# Patient Record
Sex: FEMALE | Race: WHITE | Employment: UNEMPLOYED | URBAN - METROPOLITAN AREA
[De-identification: names, ages, dates, MRNs, and addresses within clinical notes are randomized per-mention and may not be internally consistent; named-entity substitution may affect disease eponyms.]

---

## 2023-10-15 PROBLEM — L98.1 FACTITIAL DERMATITIS: Status: ACTIVE | Noted: 2022-02-10

## 2023-10-15 PROBLEM — L29.9 PRURITUS: Status: ACTIVE | Noted: 2022-02-10

## 2023-10-15 PROBLEM — L23.9 ALLERGIC DERMATITIS: Status: ACTIVE | Noted: 2023-10-15

## 2023-10-15 PROBLEM — L21.8 OTHER SEBORRHEIC DERMATITIS: Status: ACTIVE | Noted: 2022-02-10

## 2023-10-15 PROBLEM — L57.9 SKIN CHANGES DUE TO CHRONIC EXPOSURE TO NONIONIZING RADIATION, UNSPECIFIED: Status: ACTIVE | Noted: 2022-02-10

## 2023-10-15 PROBLEM — E03.9 HYPOTHYROIDISM: Status: ACTIVE | Noted: 2023-10-15

## 2023-10-15 PROBLEM — L81.0 POSTINFLAMMATORY HYPERPIGMENTATION: Status: ACTIVE | Noted: 2022-02-10

## 2023-10-15 PROBLEM — K75.9 HEPATITIS: Status: ACTIVE | Noted: 2023-10-15

## 2023-10-15 PROBLEM — H01.131 ECZEMATOUS DERMATITIS OF RIGHT UPPER EYELID: Status: ACTIVE | Noted: 2022-02-10

## 2023-10-15 PROBLEM — B85.2 LICE: Status: ACTIVE | Noted: 2023-10-15

## 2023-10-15 PROBLEM — B86 SCABIES: Status: ACTIVE | Noted: 2022-02-10

## 2023-10-15 PROBLEM — L81.4 OTHER MELANIN HYPERPIGMENTATION: Status: ACTIVE | Noted: 2022-02-10

## 2023-10-15 PROBLEM — R53.83 FATIGUE: Status: ACTIVE | Noted: 2023-10-15

## 2023-10-15 PROBLEM — L81.9 PIGMENTED SKIN LESION: Status: ACTIVE | Noted: 2023-10-15

## 2023-10-15 PROBLEM — H01.132 ECZEMATOUS DERMATITIS OF RIGHT LOWER EYELID: Status: ACTIVE | Noted: 2022-02-10

## 2023-10-15 PROBLEM — L30.9 DERMATITIS: Status: ACTIVE | Noted: 2023-10-15

## 2023-10-15 PROBLEM — L21.9 SEBORRHEIC DERMATITIS, UNSPECIFIED: Status: ACTIVE | Noted: 2022-02-10

## 2023-10-15 RX ORDER — TRETINOIN 0.25 MG/G
CREAM TOPICAL
COMMUNITY
Start: 2021-11-12 | End: 2023-10-18

## 2023-10-15 RX ORDER — TACROLIMUS 1 MG/G
OINTMENT TOPICAL
COMMUNITY
Start: 2020-10-13 | End: 2023-10-18

## 2023-10-15 RX ORDER — LEVOTHYROXINE SODIUM 150 UG/1
1 TABLET ORAL DAILY
COMMUNITY
Start: 2022-07-15 | End: 2023-10-18 | Stop reason: SDUPTHER

## 2023-10-15 RX ORDER — TRIAMCINOLONE ACETONIDE 1 MG/G
CREAM TOPICAL
COMMUNITY
Start: 2020-11-13 | End: 2023-10-18

## 2023-10-15 RX ORDER — KETOCONAZOLE 20 MG/G
CREAM TOPICAL
COMMUNITY
Start: 2020-05-20 | End: 2023-10-18

## 2023-10-15 RX ORDER — KETOCONAZOLE 20 MG/ML
SHAMPOO, SUSPENSION TOPICAL
COMMUNITY
Start: 2020-05-20 | End: 2023-10-18

## 2023-10-15 RX ORDER — PERMETHRIN 50 MG/G
CREAM TOPICAL
COMMUNITY
Start: 2020-05-20 | End: 2023-10-18

## 2023-10-18 ENCOUNTER — OFFICE VISIT (OUTPATIENT)
Dept: PRIMARY CARE | Facility: CLINIC | Age: 53
End: 2023-10-18
Payer: COMMERCIAL

## 2023-10-18 VITALS
DIASTOLIC BLOOD PRESSURE: 84 MMHG | OXYGEN SATURATION: 96 % | BODY MASS INDEX: 29.95 KG/M2 | WEIGHT: 169 LBS | HEIGHT: 63 IN | SYSTOLIC BLOOD PRESSURE: 126 MMHG | HEART RATE: 85 BPM | TEMPERATURE: 98 F

## 2023-10-18 DIAGNOSIS — E03.9 HYPOTHYROIDISM, UNSPECIFIED TYPE: ICD-10-CM

## 2023-10-18 PROCEDURE — 84443 ASSAY THYROID STIM HORMONE: CPT

## 2023-10-18 PROCEDURE — 84439 ASSAY OF FREE THYROXINE: CPT

## 2023-10-18 PROCEDURE — 99213 OFFICE O/P EST LOW 20 MIN: CPT | Performed by: FAMILY MEDICINE

## 2023-10-18 PROCEDURE — 36415 COLL VENOUS BLD VENIPUNCTURE: CPT

## 2023-10-18 RX ORDER — LEVOTHYROXINE SODIUM 150 UG/1
150 TABLET ORAL DAILY
Qty: 30 TABLET | Refills: 1 | Status: SHIPPED | OUTPATIENT
Start: 2023-10-18 | End: 2024-01-05

## 2023-10-18 NOTE — PROGRESS NOTES
"Subjective   Patient ID: Shikha Lee is a 53 y.o. female who presents for Med Refill.    HPI Pt is here for refills of medications to treat the following medical conditions. Unless otherwise stated, these conditions are well-controlled, pt is taking medications s Rx, and there are no reported side effects to medications or other treatment: Hypothyroidism - States she ran out of Synthroid 150mcg five weeks ago and resumed her previous dose of 125mcg.     Review of Systems   All other systems reviewed and are negative.      Objective   /84   Pulse 85   Temp 36.7 °C (98 °F)   Ht 1.6 m (5' 3\")   Wt 76.7 kg (169 lb)   SpO2 96%   BMI 29.94 kg/m²     Physical Exam  Constitutional:       Appearance: Normal appearance.   HENT:      Mouth/Throat:      Mouth: Mucous membranes are moist.   Eyes:      Conjunctiva/sclera: Conjunctivae normal.   Cardiovascular:      Rate and Rhythm: Normal rate and regular rhythm.   Pulmonary:      Effort: Pulmonary effort is normal.      Breath sounds: Normal breath sounds.   Abdominal:      Palpations: Abdomen is soft.   Musculoskeletal:         General: Normal range of motion.   Skin:     General: Skin is warm and dry.   Neurological:      Mental Status: She is alert and oriented to person, place, and time.   Psychiatric:         Mood and Affect: Mood normal.       Assessment/Plan Refilled medication. Repeat TSH and follow up 6-8 weeks.         "

## 2023-10-19 LAB
T4 FREE SERPL-MCNC: 1.06 NG/DL (ref 0.78–1.48)
TSH SERPL-ACNC: 20.86 MIU/L (ref 0.44–3.98)

## 2023-12-21 ENCOUNTER — OFFICE VISIT (OUTPATIENT)
Dept: URGENT CARE | Facility: CLINIC | Age: 53
End: 2023-12-21
Payer: COMMERCIAL

## 2023-12-21 ENCOUNTER — LAB (OUTPATIENT)
Dept: LAB | Facility: LAB | Age: 53
End: 2023-12-21
Payer: COMMERCIAL

## 2023-12-21 VITALS
SYSTOLIC BLOOD PRESSURE: 152 MMHG | OXYGEN SATURATION: 97 % | HEIGHT: 65 IN | HEART RATE: 81 BPM | WEIGHT: 167.2 LBS | DIASTOLIC BLOOD PRESSURE: 78 MMHG | BODY MASS INDEX: 27.86 KG/M2

## 2023-12-21 DIAGNOSIS — K04.7 DENTAL INFECTION: Primary | ICD-10-CM

## 2023-12-21 DIAGNOSIS — E03.9 HYPOTHYROIDISM, UNSPECIFIED TYPE: ICD-10-CM

## 2023-12-21 LAB
T4 FREE SERPL-MCNC: 0.96 NG/DL (ref 0.61–1.12)
TSH SERPL-ACNC: 0.6 MIU/L (ref 0.44–3.98)

## 2023-12-21 PROCEDURE — 84443 ASSAY THYROID STIM HORMONE: CPT

## 2023-12-21 PROCEDURE — 84439 ASSAY OF FREE THYROXINE: CPT

## 2023-12-21 PROCEDURE — 99213 OFFICE O/P EST LOW 20 MIN: CPT | Performed by: NURSE PRACTITIONER

## 2023-12-21 PROCEDURE — 36415 COLL VENOUS BLD VENIPUNCTURE: CPT

## 2023-12-21 RX ORDER — CLINDAMYCIN HYDROCHLORIDE 150 MG/1
450 CAPSULE ORAL 3 TIMES DAILY
Qty: 63 CAPSULE | Refills: 0 | Status: SHIPPED | OUTPATIENT
Start: 2023-12-21 | End: 2023-12-28

## 2023-12-21 ASSESSMENT — PAIN SCALES - GENERAL: PAINLEVEL: 8

## 2023-12-21 NOTE — PROGRESS NOTES
Subjective   Patient ID: Shikha Lee is a 53 y.o. female.    HPI Lower jaw pain. Had a telehealth visit 12/11 and was given antibiotics. Finished medication this morning. Pain improved at first and then got bad again. Has some swelling in the jaw. Has not been able to get into the dentist due to insurance changes.  Patient states the clindamycin she was taking was 300 mg 3 times a day last dose today.  Right side of her jaw has improved left-sided gotten worse she has very poor dentition and is post to go to the dentist to have this taken out      The following portions of the chart were reviewed this encounter and updated as appropriate:         Review of Systems   All other systems reviewed and are negative.    Objective   Physical Exam  Vitals and nursing note reviewed.   Constitutional:       General: She is not in acute distress.     Appearance: Normal appearance. She is not toxic-appearing.   HENT:      Head: Normocephalic.      Right Ear: External ear normal.      Left Ear: External ear normal.      Nose: Nose normal.      Mouth/Throat:      Mouth: Mucous membranes are moist.      Pharynx: No oropharyngeal exudate or posterior oropharyngeal erythema.      Comments: Poor dentition throughout, broken teeth noted to front left side across bottom, upper partials, uvula midline, mucous membranes are moist, no abscess appreciated, no trismus noted  Eyes:      Extraocular Movements: Extraocular movements intact.   Cardiovascular:      Rate and Rhythm: Normal rate and regular rhythm.      Heart sounds: Normal heart sounds.   Pulmonary:      Effort: Pulmonary effort is normal.      Breath sounds: Normal breath sounds. No wheezing.   Musculoskeletal:         General: Normal range of motion.      Cervical back: Normal range of motion and neck supple.   Skin:     Capillary Refill: Capillary refill takes less than 2 seconds.   Neurological:      General: No focal deficit present.      Mental Status: She is alert and  oriented to person, place, and time.   Psychiatric:         Mood and Affect: Mood normal.         Behavior: Behavior normal.         Thought Content: Thought content normal.       Procedures    Assessment/Plan   Diagnoses and all orders for this visit:  Dental infection  -     clindamycin (Cleocin HCL) 150 mg capsule; Take 3 capsules (450 mg) by mouth 3 times a day for 7 days.  Increase clindamycin dose to 450 mg 3 times a day  Continue Motrin for pain  Patient agrees    Above plan of care was reviewed with the patient, all questions were answered, through shared decision making the patient agrees with this plan of care.    Red flags for strict return precaution have been reviewed with the patient and or patient gaurdian, patient is alert, oriented and non-toxic appearing, has decision making capabilities and agrees with the plan of care through shared decision making at this time. Current diagnosis, any medication changes, lab or radiologic results have been reviewed with the patient at the time of visit. If symptoms do not improve, or worsen, patient is to follow up with PCP or report to the emergency room.   Patient is alert and oriented x3 vital signs stable nontoxic-appearing and has decision-making capabilities.    Please note that the majority this note was made with Dragon speaking software there may be grammatical errors secondary to the speaking program.

## 2024-03-04 DIAGNOSIS — E03.9 HYPOTHYROIDISM, UNSPECIFIED TYPE: ICD-10-CM

## 2024-03-07 RX ORDER — LEVOTHYROXINE SODIUM 150 UG/1
TABLET ORAL
Qty: 14 TABLET | Refills: 0 | Status: SHIPPED | OUTPATIENT
Start: 2024-03-07 | End: 2024-04-15 | Stop reason: SDUPTHER

## 2024-04-15 ENCOUNTER — TELEPHONE (OUTPATIENT)
Dept: PRIMARY CARE | Facility: CLINIC | Age: 54
End: 2024-04-15
Payer: COMMERCIAL

## 2024-04-15 DIAGNOSIS — E03.9 HYPOTHYROIDISM, UNSPECIFIED TYPE: ICD-10-CM

## 2024-04-15 RX ORDER — LEVOTHYROXINE SODIUM 150 UG/1
TABLET ORAL
Qty: 30 TABLET | Refills: 0 | Status: SHIPPED | OUTPATIENT
Start: 2024-04-15

## 2024-09-20 ENCOUNTER — OFFICE VISIT (OUTPATIENT)
Dept: PRIMARY CARE | Facility: CLINIC | Age: 54
End: 2024-09-20
Payer: COMMERCIAL

## 2024-09-20 VITALS
HEART RATE: 80 BPM | BODY MASS INDEX: 29.46 KG/M2 | DIASTOLIC BLOOD PRESSURE: 80 MMHG | OXYGEN SATURATION: 99 % | HEIGHT: 65 IN | WEIGHT: 176.8 LBS | SYSTOLIC BLOOD PRESSURE: 138 MMHG

## 2024-09-20 DIAGNOSIS — E03.9 HYPOTHYROIDISM, UNSPECIFIED TYPE: ICD-10-CM

## 2024-09-20 PROBLEM — Z86.59 HISTORY OF DEPRESSION: Status: ACTIVE | Noted: 2024-09-20

## 2024-09-20 PROBLEM — R06.00 DYSPNEA: Status: ACTIVE | Noted: 2018-04-19

## 2024-09-20 PROBLEM — F32.9 MAJOR DEPRESSIVE DISORDER: Status: ACTIVE | Noted: 2024-09-20

## 2024-09-20 PROBLEM — F22 DELUSIONS OF PARASITOSIS (MULTI): Status: ACTIVE | Noted: 2020-06-24

## 2024-09-20 PROBLEM — L25.9 CONTACT DERMATITIS AND ECZEMA: Status: ACTIVE | Noted: 2021-11-11

## 2024-09-20 PROBLEM — L29.9 PRURITUS, UNSPECIFIED: Status: ACTIVE | Noted: 2020-05-20

## 2024-09-20 PROBLEM — L98.9 INFLAMMATORY DERMATOSIS: Status: ACTIVE | Noted: 2023-10-15

## 2024-09-20 PROBLEM — G40.919 INTRACTABLE EPILEPSY WITHOUT STATUS EPILEPTICUS: Status: ACTIVE | Noted: 2024-09-20

## 2024-09-20 PROBLEM — R53.82 CHRONIC FATIGUE: Status: ACTIVE | Noted: 2023-10-15

## 2024-09-20 PROBLEM — G47.10 HYPERSOMNIA: Status: ACTIVE | Noted: 2024-09-20

## 2024-09-20 PROBLEM — G44.229 CHRONIC TENSION-TYPE HEADACHE, NOT INTRACTABLE: Status: ACTIVE | Noted: 2024-09-20

## 2024-09-20 PROCEDURE — 84443 ASSAY THYROID STIM HORMONE: CPT

## 2024-09-20 PROCEDURE — 99213 OFFICE O/P EST LOW 20 MIN: CPT | Performed by: FAMILY MEDICINE

## 2024-09-20 PROCEDURE — 3008F BODY MASS INDEX DOCD: CPT | Performed by: FAMILY MEDICINE

## 2024-09-20 PROCEDURE — 84439 ASSAY OF FREE THYROXINE: CPT

## 2024-09-20 RX ORDER — LEVOTHYROXINE SODIUM 150 UG/1
TABLET ORAL
Qty: 90 TABLET | Refills: 1 | Status: SHIPPED | OUTPATIENT
Start: 2024-09-20

## 2024-09-20 ASSESSMENT — PATIENT HEALTH QUESTIONNAIRE - PHQ9
2. FEELING DOWN, DEPRESSED OR HOPELESS: NOT AT ALL
1. LITTLE INTEREST OR PLEASURE IN DOING THINGS: NOT AT ALL
SUM OF ALL RESPONSES TO PHQ9 QUESTIONS 1 AND 2: 0

## 2024-09-20 NOTE — PROGRESS NOTES
"Subjective   Patient ID: Shikha Lee is a 54 y.o. female who presents for Follow-up.    HPI Pt is here for refills of medications to treat the following medical conditions. Unless otherwise stated, these conditions are well-controlled, pt is taking medications s Rx, and there are no reported side effects to medications or other treatment: hypothyroidism    Patient is having some tooth pain, she has not made dental appointment yet.         Review of Systems   All other systems reviewed and are negative.      Objective   /80   Pulse 80   Ht 1.651 m (5' 5\")   Wt 80.2 kg (176 lb 12.8 oz)   SpO2 99%   BMI 29.42 kg/m²     Physical Exam  Constitutional:       Appearance: Normal appearance.   HENT:      Mouth/Throat:      Mouth: Mucous membranes are moist.   Eyes:      Conjunctiva/sclera: Conjunctivae normal.   Cardiovascular:      Rate and Rhythm: Normal rate and regular rhythm.   Pulmonary:      Effort: Pulmonary effort is normal.      Breath sounds: Normal breath sounds.   Abdominal:      Palpations: Abdomen is soft.   Musculoskeletal:         General: Normal range of motion.   Skin:     General: Skin is warm and dry.   Neurological:      Mental Status: She is alert and oriented to person, place, and time.   Psychiatric:         Mood and Affect: Mood normal.         Assessment/Plan   Diagnoses and all orders for this visit:  Hypothyroidism, unspecified type  -     levothyroxine (Synthroid, Levoxyl) 150 mcg tablet; TAKE 1 TABLET BY MOUTH ONCE DAILY . APPOINTMENT REQUIRED FOR FUTURE REFILLS  -     Tsh With Reflex To Free T4 If Abnormal  -     Thyroxine, Free       "

## 2024-09-21 LAB
T4 FREE SERPL-MCNC: 1.16 NG/DL (ref 0.78–1.48)
TSH SERPL-ACNC: 14.85 MIU/L (ref 0.44–3.98)

## 2025-04-09 DIAGNOSIS — E03.9 HYPOTHYROIDISM, UNSPECIFIED TYPE: ICD-10-CM

## 2025-04-09 NOTE — TELEPHONE ENCOUNTER
Tried to reach patient to inform her an appointment is needed for refills but her voice mail box is not set up.

## 2025-04-10 RX ORDER — LEVOTHYROXINE SODIUM 150 UG/1
TABLET ORAL
Qty: 14 TABLET | Refills: 0 | Status: SHIPPED | OUTPATIENT
Start: 2025-04-10

## 2025-05-06 ENCOUNTER — APPOINTMENT (OUTPATIENT)
Dept: PRIMARY CARE | Facility: CLINIC | Age: 55
End: 2025-05-06
Payer: COMMERCIAL

## 2025-05-06 VITALS
WEIGHT: 174.6 LBS | HEART RATE: 90 BPM | BODY MASS INDEX: 29.09 KG/M2 | HEIGHT: 65 IN | OXYGEN SATURATION: 98 % | SYSTOLIC BLOOD PRESSURE: 136 MMHG | DIASTOLIC BLOOD PRESSURE: 72 MMHG

## 2025-05-06 DIAGNOSIS — E03.9 HYPOTHYROIDISM, UNSPECIFIED TYPE: ICD-10-CM

## 2025-05-06 PROCEDURE — 99213 OFFICE O/P EST LOW 20 MIN: CPT | Performed by: FAMILY MEDICINE

## 2025-05-06 PROCEDURE — 3008F BODY MASS INDEX DOCD: CPT | Performed by: FAMILY MEDICINE

## 2025-05-06 RX ORDER — LEVOTHYROXINE SODIUM 150 UG/1
TABLET ORAL
Qty: 90 TABLET | Refills: 1 | Status: SHIPPED | OUTPATIENT
Start: 2025-05-06

## 2025-05-06 ASSESSMENT — PATIENT HEALTH QUESTIONNAIRE - PHQ9
SUM OF ALL RESPONSES TO PHQ9 QUESTIONS 1 AND 2: 0
2. FEELING DOWN, DEPRESSED OR HOPELESS: NOT AT ALL
1. LITTLE INTEREST OR PLEASURE IN DOING THINGS: NOT AT ALL

## 2025-05-06 NOTE — PROGRESS NOTES
"Subjective   Patient ID: Shikha Lee is a 54 y.o. female who presents for Med Refill.    HPI Pt is here for refills of medications to treat the following medical conditions. Unless otherwise stated, these conditions are well-controlled, pt is taking medications s Rx, and there are no reported side effects to medications or other treatment: Hypothyroidism    Review of Systems   All other systems reviewed and are negative.      Objective   /72   Pulse 90   Ht 1.651 m (5' 5\")   Wt 79.2 kg (174 lb 9.6 oz)   SpO2 98%   BMI 29.05 kg/m²     Physical Exam  Constitutional:       Appearance: Normal appearance.   HENT:      Mouth/Throat:      Mouth: Mucous membranes are moist.   Eyes:      Conjunctiva/sclera: Conjunctivae normal.   Cardiovascular:      Rate and Rhythm: Normal rate and regular rhythm.   Pulmonary:      Effort: Pulmonary effort is normal.      Breath sounds: Normal breath sounds.   Abdominal:      Palpations: Abdomen is soft.   Musculoskeletal:         General: Normal range of motion.   Skin:     General: Skin is warm and dry.   Neurological:      Mental Status: She is alert and oriented to person, place, and time.   Psychiatric:         Mood and Affect: Mood normal.         Assessment/Plan   Diagnoses and all orders for this visit:  Hypothyroidism, unspecified type  -     TSH; Future  -     levothyroxine (Synthroid, Levoxyl) 150 mcg tablet; TAKE 1 TABLET BY MOUTH ONCE DAILY       "

## 2025-05-07 LAB — TSH SERPL-ACNC: 5.94 MIU/L

## 2025-06-24 ENCOUNTER — PATIENT OUTREACH (OUTPATIENT)
Dept: CARE COORDINATION | Facility: CLINIC | Age: 55
End: 2025-06-24
Payer: COMMERCIAL

## 2025-06-24 DIAGNOSIS — Z12.31 ENCOUNTER FOR SCREENING MAMMOGRAM FOR BREAST CANCER: ICD-10-CM

## 2025-09-23 ENCOUNTER — APPOINTMENT (OUTPATIENT)
Dept: PRIMARY CARE | Facility: CLINIC | Age: 55
End: 2025-09-23
Payer: COMMERCIAL